# Patient Record
Sex: FEMALE | URBAN - METROPOLITAN AREA
[De-identification: names, ages, dates, MRNs, and addresses within clinical notes are randomized per-mention and may not be internally consistent; named-entity substitution may affect disease eponyms.]

---

## 2019-06-19 ENCOUNTER — APPOINTMENT (OUTPATIENT)
Age: 74
Setting detail: DERMATOLOGY
End: 2019-06-19

## 2019-06-19 DIAGNOSIS — Z41.9 ENCOUNTER FOR PROCEDURE FOR PURPOSES OTHER THAN REMEDYING HEALTH STATE, UNSPECIFIED: ICD-10-CM

## 2019-06-19 PROCEDURE — OTHER BOTOX: OTHER

## 2019-06-19 NOTE — PROCEDURE: BOTOX
Consent: Assessment :\\n“[facility] Scales”\\n-Wrinkles= mild, Lines present at rest= mild-fine, Folds= mild, Volume Loss & Skin Laxity= mild.  \\n-Glogau Wrinkle Scale:  II-III\\n-Treatment areas reviewed with the patient while holding a hand-held mirror.  \\n-The patient has realistic expectations. \\n-Written consent obtained. Patient verbalized understanding. Questions answered. See written consent on file. \\n-Topical anesthesia  L23%T7% was applied for 10 minutes on upper lip;  Lot #[id number], exp 9/12/19 \\n-The skin was prepped with alcohol, skin markings made and injections provided. \\n-The treatment was administered to the area(s) above.\\n-The patient tolerated the procedure well w/o incident.  \\n-Additional product (Botox) may be necessary for optimal correction following treatment and/or maintenance.\\n\\n-Pt has naturally occurring heaviness in L brow compared to R.\\n-Patient instructed to not lie down for 4-6 hours.  Patient instructed not to massage or manipulate treatment areas (avoid facial treatments or Clarisonic Brush) and limit physical activity for 24 hours.\\n-Recommended:  Voluma temples, cheeks, chin and lateral jawline for structure.\\n-Instructed to avoid: NSAIDs, Vitamin E, Fish oil, Ginko Balboa and alcohol 5-7 days before filler treatment Consent: Assessment :\\n“[facility] [employer] Scales”\\n-Wrinkles= mild, Lines present at rest= mild-fine, Folds= mild, Volume Loss & Skin Laxity= mild.  \\n-Glogau Wrinkle Scale:  II-III\\n-Treatment areas reviewed with the patient while holding a hand-held mirror.  \\n-The patient has realistic expectations. \\n-Written consent obtained. Patient verbalized understanding. Questions answered. See written consent on file. \\n-Topical anesthesia  L23%T7% was applied for 10 minutes on upper lip;  Lot #[id number], exp 9/12/19 \\n-The skin was prepped with alcohol, skin markings made and injections provided. \\n-The treatment was administered to the area(s) above.\\n-The patient tolerated the procedure well w/o incident.  \\n-Additional product (Botox) may be necessary for optimal correction following treatment and/or maintenance.\\n\\n-Pt has naturally occurring heaviness in L brow compared to R.\\n-Patient instructed to not lie down for 4-6 hours.  Patient instructed not to massage or manipulate treatment areas (avoid facial treatments or Clarisonic Brush) and limit physical activity for 24 hours.\\n-Recommended:  Voluma temples, cheeks, chin and lateral jawline for structure.\\n-Instructed to avoid: NSAIDs, Vitamin E, Fish oil, Ginko Balboa and alcohol 5-7 days before filler treatment

## 2019-06-19 NOTE — PROCEDURE: BOTOX
Additional Area 4 Location: [facility] Additional Area 4 Location: [facility]

## 2019-06-19 NOTE — PROCEDURE: BOTOX
Price (Use Numbers Only, No Special Characters Or $): 247 Price (Use Numbers Only, No Special Characters Or $): 468

## 2020-03-05 ENCOUNTER — APPOINTMENT (OUTPATIENT)
Age: 75
Setting detail: DERMATOLOGY
End: 2020-03-05

## 2020-03-05 DIAGNOSIS — Z41.9 ENCOUNTER FOR PROCEDURE FOR PURPOSES OTHER THAN REMEDYING HEALTH STATE, UNSPECIFIED: ICD-10-CM

## 2020-03-05 PROCEDURE — OTHER BOTOX: OTHER

## 2020-03-05 NOTE — PROCEDURE: BOTOX
Consent: Assessment :\\n“[facility] Scales”\\n-Wrinkles= mild, Lines present at rest= mild-fine, Folds= mild, Volume Loss & Skin Laxity= mild.  \\n-Glogau Wrinkle Scale:  II-III\\n-Pt has naturally occurring heaviness in L brow compared to R.\\n\\n-Treatment areas reviewed with the patient while holding a hand-held mirror.  \\n-The patient has realistic expectations. \\n\\n-Informed patient that lower face Botox injections are considered off-label.\\n\\n-Written consent obtained. Patient verbalized understanding. Questions answered. See written consent on file. \\n\\n-Topical anesthesia  L23%T7% was applied for 10 minutes on upper lip;  Lot #[id number], exp 9/12/19 \\n-The skin was prepped with alcohol, skin markings made and injections provided. \\n-The treatment was administered to the area(s) noted.\\n-The patient tolerated the procedure well w/o incident.  \\n-Additional product (Botox) may be necessary for optimal correction following treatment and/or maintenance.\\n-Patient instructed to not lie down for 4-6 hours.  Patient instructed not to massage or manipulate treatment areas (avoid facial treatments or Clarisonic Brush) and limit physical activity for 24 hours.\\n\\n-Calipers used\\n\\n-Recommended:  Voluma temples, cheeks, chin and lateral jawline for structure.\\n-Instructed to avoid: NSAIDs, Vitamin E, Fish oil, Ginkgo Biloba  and alcohol 5-7 days before filler treatment\\n-Skin Care: Obagi hydroquinone, tretinoin Consent: Assessment :\\n“[facility] [employer] Scales”\\n-Wrinkles= mild, Lines present at rest= mild-fine, Folds= mild, Volume Loss & Skin Laxity= mild.  \\n-Glogau Wrinkle Scale:  II-III\\n-Pt has naturally occurring heaviness in L brow compared to R.\\n\\n-Treatment areas reviewed with the patient while holding a hand-held mirror.  \\n-The patient has realistic expectations. \\n\\n-Informed patient that lower face Botox injections are considered off-label.\\n\\n-Written consent obtained. Patient verbalized understanding. Questions answered. See written consent on file. \\n\\n-Topical anesthesia  L23%T7% was applied for 10 minutes on upper lip;  Lot #[id number], exp 9/12/19 \\n-The skin was prepped with alcohol, skin markings made and injections provided. \\n-The treatment was administered to the area(s) noted.\\n-The patient tolerated the procedure well w/o incident.  \\n-Additional product (Botox) may be necessary for optimal correction following treatment and/or maintenance.\\n-Patient instructed to not lie down for 4-6 hours.  Patient instructed not to massage or manipulate treatment areas (avoid facial treatments or Clarisonic Brush) and limit physical activity for 24 hours.\\n\\n-Calipers used\\n\\n-Recommended:  Voluma temples, cheeks, chin and lateral jawline for structure.\\n-Instructed to avoid: NSAIDs, Vitamin E, Fish oil, Ginkgo Biloba  and alcohol 5-7 days before filler treatment\\n-Skin Care: Obagi hydroquinone, tretinoin

## 2023-05-14 ENCOUNTER — APPOINTMENT (OUTPATIENT)
Dept: GENERAL RADIOLOGY | Facility: HOSPITAL | Age: 78
End: 2023-05-14
Attending: STUDENT IN AN ORGANIZED HEALTH CARE EDUCATION/TRAINING PROGRAM
Payer: MEDICARE

## 2023-05-14 ENCOUNTER — HOSPITAL ENCOUNTER (EMERGENCY)
Facility: HOSPITAL | Age: 78
Discharge: HOME | End: 2023-05-14
Attending: STUDENT IN AN ORGANIZED HEALTH CARE EDUCATION/TRAINING PROGRAM
Payer: MEDICARE

## 2023-05-14 VITALS
OXYGEN SATURATION: 98 % | TEMPERATURE: 99 F | HEIGHT: 64 IN | BODY MASS INDEX: 29.06 KG/M2 | WEIGHT: 170.19 LBS | DIASTOLIC BLOOD PRESSURE: 79 MMHG | SYSTOLIC BLOOD PRESSURE: 154 MMHG | RESPIRATION RATE: 17 BRPM | HEART RATE: 81 BPM

## 2023-05-14 DIAGNOSIS — L03.114 CELLULITIS OF LEFT UPPER EXTREMITY: Primary | ICD-10-CM

## 2023-05-14 PROCEDURE — 99284 EMERGENCY DEPT VISIT MOD MDM: CPT

## 2023-05-14 PROCEDURE — 73110 X-RAY EXAM OF WRIST: CPT | Performed by: STUDENT IN AN ORGANIZED HEALTH CARE EDUCATION/TRAINING PROGRAM

## 2023-05-14 PROCEDURE — 99283 EMERGENCY DEPT VISIT LOW MDM: CPT

## 2023-05-14 RX ORDER — LISINOPRIL 20 MG/1
20 TABLET ORAL 2 TIMES DAILY
COMMUNITY

## 2023-05-14 RX ORDER — BUPROPION HYDROCHLORIDE 100 MG/1
150 TABLET ORAL DAILY
COMMUNITY

## 2023-05-14 RX ORDER — CYCLOBENZAPRINE HCL 10 MG
10 TABLET ORAL NIGHTLY
COMMUNITY

## 2023-05-14 RX ORDER — SULFAMETHOXAZOLE AND TRIMETHOPRIM 800; 160 MG/1; MG/1
1 TABLET ORAL ONCE
Status: COMPLETED | OUTPATIENT
Start: 2023-05-14 | End: 2023-05-14

## 2023-05-14 RX ORDER — CELECOXIB 200 MG/1
200 CAPSULE ORAL DAILY
COMMUNITY

## 2023-05-14 RX ORDER — SULFAMETHOXAZOLE AND TRIMETHOPRIM 800; 160 MG/1; MG/1
1 TABLET ORAL 2 TIMES DAILY
Qty: 14 TABLET | Refills: 0 | Status: SHIPPED | OUTPATIENT
Start: 2023-05-14 | End: 2023-05-21

## 2023-05-14 RX ORDER — METOPROLOL SUCCINATE 25 MG/1
25 TABLET, EXTENDED RELEASE ORAL DAILY
COMMUNITY

## 2023-05-14 RX ORDER — ALPRAZOLAM 2 MG/1
2.5 TABLET ORAL NIGHTLY PRN
COMMUNITY

## 2023-05-14 RX ORDER — GABAPENTIN 300 MG/1
300 CAPSULE ORAL 2 TIMES DAILY
COMMUNITY

## 2023-05-14 RX ORDER — AMLODIPINE BESYLATE 5 MG/1
5 TABLET ORAL DAILY
COMMUNITY

## 2023-05-14 RX ORDER — ROSUVASTATIN CALCIUM 20 MG/1
20 TABLET, COATED ORAL NIGHTLY
COMMUNITY

## 2023-05-15 NOTE — ED QUICK NOTES
Discharge instructions including follow-up care and medications were reviewed and discussed with patient. Pt verbalized understanding to all information and all questions asked were answered at this time. CMS remains intact to left hand. Pt is AAOx4, calm, respirations noted as even and unlabored, skin warm and dry, and there are no signs or symptoms of distress noted at this time. Pt ambulatory with a steady gait to exit with family.

## 2023-05-15 NOTE — ED INITIAL ASSESSMENT (HPI)
68y F to ED via personal car with c/o left wrist pain and swelling. Patient developed pain and swelling to left wrist (ulnar side especially) last night. She denies any trauma to the area. Today, there is spreading of pain/redness swelling towards hand and up towards forearm. Patient is applying ice in triage. She felt a \"prick\" on her hand 2 days ago, and wonders if she was bit by a bug.

## 2023-05-15 NOTE — DISCHARGE INSTRUCTIONS
Return to the emergency department if you develop significant pain or swelling at the site of  your infection, if you develop high fevers, nausea, vomiting, or diarrhea, or if the redness of your  skin is extending beyond the area where it is red today as these could be signs of worsening  infection requiring further medical care. If your infection was outlined in pen today, return to the  emergency department if the redness extends beyond the area outlined by the pen.